# Patient Record
Sex: MALE | Race: WHITE | NOT HISPANIC OR LATINO | URBAN - NONMETROPOLITAN AREA
[De-identification: names, ages, dates, MRNs, and addresses within clinical notes are randomized per-mention and may not be internally consistent; named-entity substitution may affect disease eponyms.]

---

## 2019-12-16 NOTE — PATIENT DISCUSSION
DISCUSSED LARGER AMOUNT OF ASTIGMATISM IN THE OD, QUESTIONABLE AMBLYOPIA. PATIENT DENIES ANY HISTORY OF A LAZY EYE.

## 2019-12-16 NOTE — PATIENT DISCUSSION
Surgery Counseling: I have discussed the option of glasses versus cataract surgery versus following. It was explained that when the patients vision no longer meets their visual needs and a glasses prescription does not improve visual symptoms, the option of cataract surgery is a reasonable next step. It was explained that there is no guarantee that removing the cataract will improve their visual symptoms, however; it is believed that the cataract is contributing to the patient's visual impairment and surgery may improve both the visual and functional status of the patient. The risks, benefits and alternatives of surgery were discussed with the patient. After this discussion, the patient desires to proceed with cataract surgery with implantation of an intraocular lens to improve vision with aid of glasses.

## 2019-12-16 NOTE — PATIENT DISCUSSION
New Prescription: prednisolone acetate (prednisolone acetate): drops,suspension: 1% 1 drop four times a day as directed 12-

## 2020-01-09 NOTE — PATIENT DISCUSSION
Post-Op Instructions: The patient was instructed in the proper use of post-operative eye drops: Ofloxacin in the surgical eye 4 times per day for 2 weeks, then discontinue; Prednisolone 4 times per day, then reduce to 2 times per day for 2 weeks, then discontinue. Call back instructions, retinal detachment and endophthalmitis precautions given.

## 2020-01-09 NOTE — PATIENT DISCUSSION
Continue: prednisolone acetate (prednisolone acetate): drops,suspension: 1% 1 drop four times a day as directed 12-

## 2021-04-13 NOTE — PATIENT DISCUSSION
POSTERIOR CAPSULAR FIBROSIS/OPACIFICATION, OU - NOT VISUALLY SIGNIFICANT.  VISION MOST LIKELY BEING LIMITED BY THE DRY EYE/CORNEA. MONITOR X 6 MONTHS.

## 2021-04-13 NOTE — PATIENT DISCUSSION
Dry Eye Syndrome (CRISTIAN) Counseling: Dry Eye Syndrome, also known as Keratoconjunctivitis Sicca, is a common condition that occurs when your tears are not able to provide adequate lubrication for your eyes. Symptoms can be exacerbated by environmental factors such as smoke, wind, or prolonged eye use. Treatment options include, but are not limited to, artificial tears, punctal plugs, Restasis, oral omega-3 supplements, and lubricating ointments. I have explained to the patient that succesful management is dependent on patient compliance with treatment as prescribed and/or the treatment regimen.

## 2021-04-13 NOTE — PATIENT DISCUSSION
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 RETURN FOR FOLLOW-UP AS SCHEDULED FOR DILATED EXAM. STRESSED IMPORTANCE OF GOOD BLOOD SUGAR CONTROL &amp; ROUTINE CARE WITH PCP AS DIRECTED.

## 2021-04-13 NOTE — PATIENT DISCUSSION
CRISTIAN/K SICCA, OU - INCREASE ARTIFICIAL TEARS QID OU AND THE DAILY INTAKE OF OMEGA 3/FATTY ACIDS (PRN, [employer]). CONSIDER OTHER TREATMENT OPTIONS IF SYMPTOMS PERSIST/WORSEN. FOLLOW.

## 2021-06-21 ENCOUNTER — IMPORTED ENCOUNTER (OUTPATIENT)
Dept: URBAN - NONMETROPOLITAN AREA CLINIC 1 | Facility: CLINIC | Age: 76
End: 2021-06-21

## 2021-06-21 PROBLEM — H53.022: Noted: 2021-06-21

## 2021-06-21 PROBLEM — H25.813: Noted: 2021-06-21

## 2021-06-21 PROCEDURE — 92004 COMPRE OPH EXAM NEW PT 1/>: CPT

## 2021-06-21 NOTE — PATIENT DISCUSSION
Cataract(s)-Visually significant cataract OU .-Cataract(s) causing symptomatic impairment of visual function not correctable with a tolerable change in glasses or contact lenses lighting or non-operative means resulting in specific activity limitations and/or participation restrictions including but not limited to reading viewing television driving or meeting vocational or recreational needs. -Expectation is clearer vision and functional improvement in symptoms as well as reduced glare disability after cataract removal.-Order IOLMaster and OPD today. -Recommend Toric IOL   /   Limbal Relaxing Incisions based on today's OPD testing and lifestyle questionnaire.-All questions were answered regarding surgery including pre and post-op medications appointments activity restrictions and anesthetic usage.-The risks benefits and alternatives and special risk factors for the patient were discussed in detail including but not limited to: bleeding infection retinal detachment vitreous loss problems with the implant and possible need for additional surgery.-Although rare the possibility of complete vision loss was discussed.-The possible need for glasses post-operatively was discussed.-Order medical clearance exam based on history of high cholesterol -Patient elects to proceed with cataract surgery OS . Will schedule at patient's convenience and re-evaluate OD  in the future. Amblyopia OS -discussed realistic [facility] expectations s/p surgery. Patient wishe to do OS first  and understands [facility] will never be as good [facility] in OD. Qualifies for Toric and discussed lens benefits w/ less dependence on glasses for distance but informed him he will need glasses for reading. Discussed patients current RX and advised him the possibility of needing glasses for distance d/t may not have implant w/ enough power to correct her hyperopic rx. Pt elects Trad Sx. Post op inflammation anticipated discussed dextenza insertion after surgery.

## 2021-07-15 ENCOUNTER — IMPORTED ENCOUNTER (OUTPATIENT)
Dept: URBAN - NONMETROPOLITAN AREA CLINIC 1 | Facility: CLINIC | Age: 76
End: 2021-07-15

## 2021-07-15 PROBLEM — E03.9: Noted: 2021-07-15

## 2021-07-15 PROBLEM — H53.022: Noted: 2021-07-15

## 2021-07-15 PROBLEM — Z01.818: Noted: 2021-07-15

## 2021-07-15 PROBLEM — H25.813: Noted: 2021-07-15

## 2021-07-15 PROBLEM — N40.1: Noted: 2021-07-15

## 2021-07-15 PROBLEM — E78.5: Noted: 2021-07-15

## 2021-07-15 PROCEDURE — 99214 OFFICE O/P EST MOD 30 MIN: CPT

## 2021-08-06 ENCOUNTER — IMPORTED ENCOUNTER (OUTPATIENT)
Dept: URBAN - NONMETROPOLITAN AREA CLINIC 1 | Facility: CLINIC | Age: 76
End: 2021-08-06

## 2021-08-06 PROCEDURE — 99024 POSTOP FOLLOW-UP VISIT: CPT

## 2021-08-06 NOTE — PATIENT DISCUSSION
"""s/p PCIOL-Pt doing well at 1 week s/p PCIOL OS .-Continue post-op gtts according to instruction sheet.-Okay to resume usual activites and d/c eye shield. Cataract(s) -Visually significant cataract OD . -Cataract(s) causing symptomatic impairment of visual function not correctable with a tolerable change in glasses or contact lenses lighting or non-operative means resulting in specific activity limitations and/or participation restrictions including but not limited to reading viewing television driving or meeting vocational or recreational needs. -Expectation is clearer vision and functional improvement in symptoms as well as reduced glare disability after cataract removal. -Order IOLMaster and OPD today. -Recommend Toric IOL / Limbal Relaxing Incisions based on today's OPD testing and lifestyle questionnaire. -All questions were answered regarding surgery including pre and post-op medications appointments activity restrictions and anesthetic usage. -The risks benefits and alternatives and special risk factors for the patient were discussed in detail including but not limited to: bleeding infection retinal detachment vitreous loss problems with the implant and possible need for additional surgery. -Although rare the possibility of complete vision loss was discussed. -The possible need for glasses post-operatively was discussed. -Order medical clearance exam based on history of high cholesterol -Patient elects to proceed with cataract surgery OD . Amblyopia OS -discussed realistic [facility] expectations s/p surgery. Patient wishe to do OS first and understands [facility] will never [facility]. Qualifies for Toric and discussed lens benefits w/ less dependence on glasses for distance but informed him he will need glasses for reading. Discussed patients current RX and advised him the possibility of needing glasses for distance d/t may not have implant w/ enough power to correct her hyperopic rx. Pt elects Trad Sx. Post op inflammation anticipated discussed dextenza insertion after surgery. "

## 2021-08-10 ENCOUNTER — IMPORTED ENCOUNTER (OUTPATIENT)
Dept: URBAN - NONMETROPOLITAN AREA CLINIC 1 | Facility: CLINIC | Age: 76
End: 2021-08-10

## 2021-08-10 PROBLEM — Z98.42: Noted: 2021-08-10

## 2021-08-10 PROCEDURE — 99024 POSTOP FOLLOW-UP VISIT: CPT

## 2021-08-10 NOTE — PATIENT DISCUSSION
5 day POV CE OS 8/5/2021 TORIC w/Dextenza- Discussed diagnosis in detail with patient. - Patient doing well and stable. - Continue post op drops as directed. - Continue to monitor. Follow up with Dr. Francisco Willis

## 2021-10-05 NOTE — PATIENT DISCUSSION
posterior capsular opacification progressing. It was not causing any decreased vision or glare at this time. No treatment was recommended at this time.

## 2022-01-25 NOTE — PATIENT DISCUSSION
Ed. patient.  Patient states second episode in 6 mos. Reassurance given. Refer to PCP Dr. Micah Carlin for stroke work up. MRI/MRA as well as blood work including ESR, CRP.

## 2022-04-10 ASSESSMENT — VISUAL ACUITY
OD_PH: 20/80
OD_PAM: 20/20
OS_CC: 20/300
OD_GLARE: 20/400
OD_CC: 20/150
OS_SC: 20/100
OD_GLARE: 20/400
OD_PAM: 20/20
OS_CC: 20/80
OD_PAM: 20/20
OS_CC: 20/100-
OS_CC: 20/400
OS_AM: 20/30-2
OD_SC: 20/25
OD_GLARE: 20/400
OD_CC: 20/200
OD_CC: 20/25
OD_GLARE: 20/400
OD_PH: 20/100

## 2022-04-10 ASSESSMENT — TONOMETRY
OD_IOP_MMHG: 16
OS_IOP_MMHG: 17
OD_IOP_MMHG: 15
OS_IOP_MMHG: 16
OD_IOP_MMHG: 16
OS_IOP_MMHG: 15

## 2022-04-12 NOTE — PATIENT DISCUSSION
Posterior capsular opacity accounts for the patient's complaints and is interfering with activities of daily living. Discussed risks and benefits of YAG Laser Capsulotomy.

## 2022-04-19 NOTE — PATIENT DISCUSSION
I have reviewed the risks, benefits and alternatives of YAG laser surgery for the treatment of the fibrosis. The uncommon risk of an increase in intraocular pressure or a retinal detachment and their associated symptoms were explained to the patient. The patient understands and desires to proceed with the laser surgery to improve their vision.

## 2022-04-19 NOTE — PATIENT DISCUSSION
Posterior capsular opacity accounts for the patient's complaints and is interfering with activities of daily living. Discussed risks and benefits of YAG Laser Capsulotomy. Patient wishes to proceed. Schedule YAG Laser Capsulotomy in the OD eye.

## 2024-04-29 NOTE — PATIENT DISCUSSION
Called pt, states he recently had back surgery & the pain from is back is causing his angina to worsen. Pt states he was given muscle relaxants after the back surgery which states he relieved his angina. Pt advised to speak with the back surgeon but states they told him to call cardiology. Pt advised to speak with PCP or to call back if symptoms continue or worsen. Farheen BLEDSOE    [employer]
